# Patient Record
Sex: MALE | Race: WHITE
[De-identification: names, ages, dates, MRNs, and addresses within clinical notes are randomized per-mention and may not be internally consistent; named-entity substitution may affect disease eponyms.]

---

## 2022-09-06 ENCOUNTER — HOSPITAL ENCOUNTER (EMERGENCY)
Dept: HOSPITAL 4 - SED | Age: 47
Discharge: LEFT BEFORE DISCHARGE | End: 2022-09-06
Payer: MEDICAID

## 2022-09-06 VITALS — SYSTOLIC BLOOD PRESSURE: 120 MMHG

## 2022-09-06 VITALS — WEIGHT: 180 LBS | BODY MASS INDEX: 24.38 KG/M2 | HEIGHT: 72 IN

## 2022-09-06 VITALS — SYSTOLIC BLOOD PRESSURE: 138 MMHG

## 2022-09-06 DIAGNOSIS — Z20.822: ICD-10-CM

## 2022-09-06 DIAGNOSIS — R07.89: Primary | ICD-10-CM

## 2022-09-06 DIAGNOSIS — Z79.899: ICD-10-CM

## 2022-09-06 DIAGNOSIS — E86.0: ICD-10-CM

## 2022-09-06 DIAGNOSIS — D72.829: ICD-10-CM

## 2022-09-06 DIAGNOSIS — N17.9: ICD-10-CM

## 2022-09-06 LAB
ALBUMIN SERPL BCP-MCNC: 5.5 G/DL (ref 3.4–4.8)
ALT SERPL W P-5'-P-CCNC: 21 U/L (ref 12–78)
ANION GAP SERPL CALCULATED.3IONS-SCNC: 21 MMOL/L (ref 5–15)
AST SERPL W P-5'-P-CCNC: 25 U/L (ref 10–37)
BASOPHILS # BLD AUTO: 0.1 K/UL (ref 0–0.2)
BASOPHILS NFR BLD AUTO: 0.2 % (ref 0–2)
BILIRUB SERPL-MCNC: 1.5 MG/DL (ref 0–1)
BUN SERPL-MCNC: 46 MG/DL (ref 8–21)
CALCIUM SERPL-MCNC: 10 MG/DL (ref 8.4–11)
CHLORIDE SERPL-SCNC: 90 MMOL/L (ref 98–107)
CREAT SERPL-MCNC: 8.64 MG/DL (ref 0.55–1.3)
EOSINOPHIL # BLD AUTO: 0 K/UL (ref 0–0.4)
EOSINOPHIL NFR BLD AUTO: 0.2 % (ref 0–4)
ERYTHROCYTE [DISTWIDTH] IN BLOOD BY AUTOMATED COUNT: 13.8 % (ref 9–15)
GFR SERPL CREATININE-BSD FRML MDRD: 9 ML/MIN (ref 90–?)
GLUCOSE SERPL-MCNC: 121 MG/DL (ref 70–99)
HCT VFR BLD AUTO: 53.1 % (ref 36–54)
HGB BLD-MCNC: 18.3 G/DL (ref 14–18)
LYMPHOCYTES # BLD AUTO: 1.3 K/UL (ref 1–5.5)
LYMPHOCYTES NFR BLD AUTO: 6.2 % (ref 20.5–51.5)
MCH RBC QN AUTO: 32 PG (ref 27–31)
MCHC RBC AUTO-ENTMCNC: 34 % (ref 32–36)
MCV RBC AUTO: 92 FL (ref 79–98)
MONOCYTES # BLD MANUAL: 1.5 K/UL (ref 0–1)
MONOCYTES # BLD MANUAL: 7.4 % (ref 1.7–9.3)
NEUTROPHILS # BLD AUTO: 17.9 K/UL (ref 1.8–7.7)
NEUTROPHILS NFR BLD AUTO: 86 % (ref 40–70)
PLATELET # BLD AUTO: 307 K/UL (ref 130–430)
POTASSIUM SERPL-SCNC: 4.3 MMOL/L (ref 3.5–5.1)
RBC # BLD AUTO: 5.78 MIL/UL (ref 4.2–6.2)
SODIUM SERPLBLD-SCNC: 135 MMOL/L (ref 136–145)
WBC # BLD AUTO: 20.8 K/UL (ref 4.8–10.8)

## 2022-09-06 PROCEDURE — 93005 ELECTROCARDIOGRAM TRACING: CPT

## 2022-09-06 PROCEDURE — 84484 ASSAY OF TROPONIN QUANT: CPT

## 2022-09-06 PROCEDURE — 36415 COLL VENOUS BLD VENIPUNCTURE: CPT

## 2022-09-06 PROCEDURE — 96360 HYDRATION IV INFUSION INIT: CPT

## 2022-09-06 PROCEDURE — 87426 SARSCOV CORONAVIRUS AG IA: CPT

## 2022-09-06 PROCEDURE — 80053 COMPREHEN METABOLIC PANEL: CPT

## 2022-09-06 PROCEDURE — 71045 X-RAY EXAM CHEST 1 VIEW: CPT

## 2022-09-06 PROCEDURE — 85025 COMPLETE CBC W/AUTO DIFF WBC: CPT

## 2022-09-06 PROCEDURE — 99285 EMERGENCY DEPT VISIT HI MDM: CPT
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
## 2022-09-06 NOTE — NUR
PATIENT TOLD RN HE WANTED TO USE THE BATHROOM, RN DISCONNECTED PATIENT FROM THE 
 IV AND WHEN PATIENT RE TURNED FROM THE THE BATHROOM PATIENT WAS CONNECTED BACK 
TO THE INFUSION PUMP.  RN THEN  WENT TO CARE FOR PATIENT IN BED 2   WHO WAS 
UNSTABLE AT THIS TIME. BY THE TIME RN CAME OUT  , PATIENT HAD PULLED OUT HIS 
PIV  AND WENT AMA. WE LOOKEB ALL OVER, AND PT COULD NOT BE FOUNG, PATIENT LEFT 
AMA WITH HIS BELONGING S ON TH BED,MD, SECURITY AND SUPEVISOR MADE AWARE

## 2022-09-07 ENCOUNTER — HOSPITAL ENCOUNTER (INPATIENT)
Dept: HOSPITAL 4 - SED | Age: 47
LOS: 3 days | Discharge: HOME | DRG: 465 | End: 2022-09-10
Attending: SPECIALIST | Admitting: SPECIALIST
Payer: MEDICAID

## 2022-09-07 VITALS — WEIGHT: 158 LBS | BODY MASS INDEX: 23.95 KG/M2 | HEIGHT: 68 IN

## 2022-09-07 VITALS — SYSTOLIC BLOOD PRESSURE: 147 MMHG

## 2022-09-07 VITALS — SYSTOLIC BLOOD PRESSURE: 121 MMHG

## 2022-09-07 DIAGNOSIS — Z79.899: ICD-10-CM

## 2022-09-07 DIAGNOSIS — N17.9: ICD-10-CM

## 2022-09-07 DIAGNOSIS — N13.30: Primary | ICD-10-CM

## 2022-09-07 LAB
ALBUMIN SERPL BCP-MCNC: 4.6 G/DL (ref 3.4–4.8)
ALT SERPL W P-5'-P-CCNC: 27 U/L (ref 12–78)
AMPHETAMINES UR QL SCN: POSITIVE
ANION GAP SERPL CALCULATED.3IONS-SCNC: 18 MMOL/L (ref 5–15)
APPEARANCE UR: (no result)
AST SERPL W P-5'-P-CCNC: 26 U/L (ref 10–37)
BACTERIA URNS QL MICRO: (no result) /HPF
BARBITURATES UR QL SCN: NEGATIVE
BASOPHILS # BLD AUTO: 0.1 K/UL (ref 0–0.2)
BASOPHILS NFR BLD AUTO: 0.5 % (ref 0–2)
BENZODIAZ UR QL SCN: NEGATIVE
BILIRUB SERPL-MCNC: 1.7 MG/DL (ref 0–1)
BILIRUB UR QL STRIP: NEGATIVE
BUN SERPL-MCNC: 62 MG/DL (ref 8–21)
BZE UR QL SCN: NEGATIVE
CALCIUM SERPL-MCNC: 9.2 MG/DL (ref 8.4–11)
CANNABINOIDS UR QL SCN: NEGATIVE
CHLORIDE SERPL-SCNC: 90 MMOL/L (ref 98–107)
COLOR UR: YELLOW
CREAT SERPL-MCNC: 6.39 MG/DL (ref 0.55–1.3)
EOSINOPHIL # BLD AUTO: 0 K/UL (ref 0–0.4)
EOSINOPHIL NFR BLD AUTO: 0.3 % (ref 0–4)
ERYTHROCYTE [DISTWIDTH] IN BLOOD BY AUTOMATED COUNT: 13.5 % (ref 9–15)
GFR SERPL CREATININE-BSD FRML MDRD: 12 ML/MIN (ref 90–?)
GLUCOSE SERPL-MCNC: 112 MG/DL (ref 70–99)
GLUCOSE UR STRIP-MCNC: NEGATIVE MG/DL
HCT VFR BLD AUTO: 46.7 % (ref 36–54)
HGB BLD-MCNC: 16.6 G/DL (ref 14–18)
HGB UR QL STRIP: (no result)
KETONES UR STRIP-MCNC: NEGATIVE MG/DL
LEUKOCYTE ESTERASE UR QL STRIP: NEGATIVE
LYMPHOCYTES # BLD AUTO: 1.5 K/UL (ref 1–5.5)
LYMPHOCYTES NFR BLD AUTO: 11.8 % (ref 20.5–51.5)
MCH RBC QN AUTO: 32 PG (ref 27–31)
MCHC RBC AUTO-ENTMCNC: 36 % (ref 32–36)
MCV RBC AUTO: 91 FL (ref 79–98)
METHADONE UR-SCNC: NEGATIVE UMOL/L
METHAMPHET UR-SCNC: POSITIVE UMOL/L
MONOCYTES # BLD MANUAL: 1.3 K/UL (ref 0–1)
MONOCYTES # BLD MANUAL: 10.1 % (ref 1.7–9.3)
NEUTROPHILS # BLD AUTO: 10.1 K/UL (ref 1.8–7.7)
NEUTROPHILS NFR BLD AUTO: 77.3 % (ref 40–70)
NITRITE UR QL STRIP: NEGATIVE
OPIATES UR QL SCN: NEGATIVE
OXYCODONE SERPL-MCNC: NEGATIVE NG/ML
PCP UR QL SCN: NEGATIVE
PH UR STRIP: 5.5 [PH] (ref 5–8)
PLATELET # BLD AUTO: 271 K/UL (ref 130–430)
POTASSIUM SERPL-SCNC: 3.6 MMOL/L (ref 3.5–5.1)
PROT UR QL STRIP: (no result)
RBC # BLD AUTO: 5.15 MIL/UL (ref 4.2–6.2)
RBC #/AREA URNS HPF: (no result) /HPF (ref 0–3)
SP GR UR STRIP: >=1.03 (ref 1–1.03)
TRICYCLICS UR-MCNC: NEGATIVE NG/ML
URINE PROPOXYPHENE SCREEN: NEGATIVE
UROBILINOGEN UR STRIP-MCNC: 0.2 MG/DL (ref 0.2–1)
WBC # BLD AUTO: 13.1 K/UL (ref 4.8–10.8)
WBC #/AREA URNS HPF: (no result) /HPF (ref 0–3)

## 2022-09-07 RX ADMIN — SODIUM CHLORIDE SCH MLS/HR: 9 INJECTION, SOLUTION INTRAVENOUS at 15:29

## 2022-09-07 NOTE — NUR
CARE ENDORSED TO CHAYA LOMAS. NAD NOTED. PT AMBULATORY, AAOX4. BOTH SIDE RAILS UP. 
AWAITING MD REASSESS AND POSSIBLE ADMISSION DECISION.

## 2022-09-07 NOTE — NUR
opening note

received report from ED RN.  patient in bed, respirations even, non labored, bed in low and 
locked position call light  within reach, IVF running as ordered.   elise draining by 
gravity.

## 2022-09-07 NOTE — NUR
CLOSING NOTE

PROVIDED SBAR TO NIGHT RN.  PATIENT IN BED, RESPIRATIONS EVEN NON LABORED, BED IN LOW AND 
LOCKED POSITION, CALL LIGHT WITHIN REACH.  IVF RUNNING AS ORDERED.  ENDORSED PICTURE TO 
WOUND ON RIGHT FOREARM. no concerns

## 2022-09-07 NOTE — NUR
MD

SPOKE WITH DR VEGA INFORMED THAT PATIENT HAS A HISTORY OF  ANXIETY AND IS REQUESTING 
MEDICATION FOR PRN.

NEW ORDER RECEIVED 



NEW ORDER FOR DIET RENAL

## 2022-09-07 NOTE — NUR
Patient will be admitted to care of [DR GARY QUINONEZ].  Admitted to [ M/S] unit.  
Will go to room [130 B].  Belongings list completed.  Complete and up to date 
summary report printed. SBAR report to be given at bedside with opportunity for 
questions.

## 2022-09-07 NOTE — NUR
Patient will be admitted to care of DR GARY QUINONEZ  Admitted to  unit.  Will go to 
room .  Belongings list completed.  Complete and up to date summary report 
printed . SBAR report to be given at bedside with opportunity for questions.

-------------------------------------------------------------------------------

Addendum: 09/07/22 at 1521 by [email or web address]

-------------------------------------------------------------------------------

REPORT GIVEN TO IVELISSE LARKIN TO ROOM 130 B.

## 2022-09-08 VITALS — SYSTOLIC BLOOD PRESSURE: 130 MMHG

## 2022-09-08 VITALS — SYSTOLIC BLOOD PRESSURE: 127 MMHG

## 2022-09-08 VITALS — SYSTOLIC BLOOD PRESSURE: 129 MMHG

## 2022-09-08 VITALS — SYSTOLIC BLOOD PRESSURE: 125 MMHG

## 2022-09-08 VITALS — SYSTOLIC BLOOD PRESSURE: 134 MMHG

## 2022-09-08 LAB
ANION GAP SERPL CALCULATED.3IONS-SCNC: 9 MMOL/L (ref 5–15)
BUN SERPL-MCNC: 50 MG/DL (ref 8–21)
CALCIUM SERPL-MCNC: 8.8 MG/DL (ref 8.4–11)
CHLORIDE SERPL-SCNC: 101 MMOL/L (ref 98–107)
CREAT SERPL-MCNC: 1.45 MG/DL (ref 0.55–1.3)
GFR SERPL CREATININE-BSD FRML MDRD: 67 ML/MIN (ref 90–?)
GLUCOSE SERPL-MCNC: 103 MG/DL (ref 70–99)
POTASSIUM SERPL-SCNC: 3.7 MMOL/L (ref 3.5–5.1)

## 2022-09-08 RX ADMIN — SODIUM CHLORIDE SCH MLS/HR: 9 INJECTION, SOLUTION INTRAVENOUS at 04:06

## 2022-09-08 RX ADMIN — SODIUM CHLORIDE SCH MLS/HR: 9 INJECTION, SOLUTION INTRAVENOUS at 17:24

## 2022-09-08 RX ADMIN — DEXTROSE MONOHYDRATE SCH MLS/HR: 50 INJECTION, SOLUTION INTRAVENOUS at 11:23

## 2022-09-08 NOTE — NUR
ACSW Leticia responded to a generated  referral. ACSW reviewed patient notes, 
assessments, and toxicology results. 



ACSW Leticia met with patient at bedside. ACSW completed introductions, provided business 
card and reason for referral. 



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 
weekly. Longest time of sobriety x1 year



Mental Health- Patient discloses previous diagnosis of Depression. He is being followed for 
medication management monthly with a psychiatrist at [facility] in [place]. Denies 
participation in regular individual therapy. 



Housing- Patient discloses he alternates between staying in his car and with his sister. Due 
to his health concerns, patient disclosed he is talking to his sister Natalie Celeste 
[phone] about living with her. 



Social- Patient shares he has family support. He has stayed with his daughter Andreia, but 
she lives with her mom/his ex which is no longer an option. 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 He currently 
receives GR. 



ACSW discussed the impact/connection of substance abuse, mental health, and physical health 
on one another. ACSW acknowledged and reflected on his periods of sobriety, and encouraged 
him to utilize the coping/tools used during that time to work towards sobriety. ACSW also 
encouraged patient to continue speaking with sister about staying with her. ACSW encouraged 
patient to secure a PCP to continue care following discharge. 



ACSW will continue to be available as needed.

## 2022-09-08 NOTE — NUR
ROUNDS



PATIENT IS SLEEPING, STABLE, NO SIGNS OF RESPIRATORY DISTRESS. BED IS LOCKED AND AT THE 
LOWEST LEVEL.

## 2022-09-08 NOTE — NUR
ROUNDS



PATIENT STILL SLEEPING IN BED, STABLE, NO SIGNS OF RESPIRATORY DISTRESS. CALL LIGHT WITHIN 
REACH. BED IS LOCKED AND AT THE LOWEST LEVEL.

## 2022-09-08 NOTE — NUR
OPENING NOTE



PATIENT IS RESTING IN BED, PT IS A0X4. PT ON R/A WITH SAT'S AT 99% NO SIGNS OF RESPIRATORY 
DISTRESS.  CALL LIGHT PLACED WITHIN REACH. BED IS LOCKED, ALARMED, AND AT THE LOWEST LEVEL. 
ALL SAFETY MEASURES IN PLACE

## 2022-09-08 NOTE — NUR
ASSUMPTION OF CARE



BEDSIDE REPORT RECEIVED FROM KELLEE SCOTT. AT THIS TIME, PATIENT IS RESTING IN BED, STABLE, 
NO SIGNS OF RESPIRATORY DISTRESS. PLAN OF CARE FOR THE REMAINDER OF THE EVENING IS 
COMMUNICATED WITH THE PATIENT. CALL LIGHT PLACED WITHIN REACH. BED IS LOCKED, ALARMED, AND 
AT THE LOWEST LEVEL. 

-------------------------------------------------------------------------------

Addendum: 09/08/22 at 0243 by [employer], RN RN

-------------------------------------------------------------------------------

CHAYA SCOTT**** (NOT RD)

## 2022-09-08 NOTE — NUR
CLOSING NOTE



PATIENT SLEPT WELL THROUGHOUT THE NIGHT. AT THIS TIME, HE IS RESTING IN BED, STABLE, NO 
SIGNS OF RESPIRATORY DISTRESS. CALL LIGHT PLACED WITHIN REACH. BED IS LOCKED AND AT THE 
LOWEST LEVEL. FALL AND SAFETY PRECAUTIONS HAVE BEEN IN PLACE THROUGHOUT THE SHIFT. WILL 
CONTINUE TO MONITOR UNTIL SHIFT REPORT IS GIVEN AT BEDSIDE TO AM NURSE.

## 2022-09-09 VITALS — SYSTOLIC BLOOD PRESSURE: 132 MMHG

## 2022-09-09 VITALS — SYSTOLIC BLOOD PRESSURE: 117 MMHG

## 2022-09-09 VITALS — SYSTOLIC BLOOD PRESSURE: 123 MMHG

## 2022-09-09 VITALS — SYSTOLIC BLOOD PRESSURE: 125 MMHG

## 2022-09-09 VITALS — SYSTOLIC BLOOD PRESSURE: 137 MMHG

## 2022-09-09 LAB
ALBUMIN SERPL BCP-MCNC: 3 G/DL (ref 3.4–4.8)
ALT SERPL W P-5'-P-CCNC: 30 U/L (ref 12–78)
ANION GAP SERPL CALCULATED.3IONS-SCNC: 6 MMOL/L (ref 5–15)
AST SERPL W P-5'-P-CCNC: 28 U/L (ref 10–37)
BASOPHILS # BLD AUTO: 0 K/UL (ref 0–0.2)
BASOPHILS NFR BLD AUTO: 0.8 % (ref 0–2)
BILIRUB SERPL-MCNC: 0.7 MG/DL (ref 0–1)
BUN SERPL-MCNC: 29 MG/DL (ref 8–21)
CALCIUM SERPL-MCNC: 8.5 MG/DL (ref 8.4–11)
CHLORIDE SERPL-SCNC: 105 MMOL/L (ref 98–107)
CREAT SERPL-MCNC: 0.98 MG/DL (ref 0.55–1.3)
EOSINOPHIL # BLD AUTO: 0.1 K/UL (ref 0–0.4)
EOSINOPHIL NFR BLD AUTO: 1.9 % (ref 0–4)
ERYTHROCYTE [DISTWIDTH] IN BLOOD BY AUTOMATED COUNT: 13.1 % (ref 9–15)
GFR SERPL CREATININE-BSD FRML MDRD: 106 ML/MIN (ref 90–?)
GLUCOSE SERPL-MCNC: 87 MG/DL (ref 70–99)
HCT VFR BLD AUTO: 37.5 % (ref 36–54)
HGB BLD-MCNC: 13.1 G/DL (ref 14–18)
LYMPHOCYTES # BLD AUTO: 1.6 K/UL (ref 1–5.5)
LYMPHOCYTES NFR BLD AUTO: 30.3 % (ref 20.5–51.5)
MCH RBC QN AUTO: 33 PG (ref 27–31)
MCHC RBC AUTO-ENTMCNC: 35 % (ref 32–36)
MCV RBC AUTO: 94 FL (ref 79–98)
MONOCYTES # BLD MANUAL: 0.7 K/UL (ref 0–1)
MONOCYTES # BLD MANUAL: 14.3 % (ref 1.7–9.3)
NEUTROPHILS # BLD AUTO: 2.7 K/UL (ref 1.8–7.7)
NEUTROPHILS NFR BLD AUTO: 52.7 % (ref 40–70)
PLATELET # BLD AUTO: 178 K/UL (ref 130–430)
POTASSIUM SERPL-SCNC: 4.5 MMOL/L (ref 3.5–5.1)
RBC # BLD AUTO: 4 MIL/UL (ref 4.2–6.2)
WBC # BLD AUTO: 5.2 K/UL (ref 4.8–10.8)

## 2022-09-09 RX ADMIN — DEXTROSE MONOHYDRATE SCH MLS/HR: 50 INJECTION, SOLUTION INTRAVENOUS at 15:04

## 2022-09-09 RX ADMIN — SODIUM CHLORIDE SCH MLS/HR: 9 INJECTION, SOLUTION INTRAVENOUS at 05:06

## 2022-09-09 NOTE — NUR
ATTENDING MD DR SALVADOR WAS CALLED, RE:  TO MARTIN PALACIO WHEN PT GOES HOME OR NOT.  SPOKE TO 
SHEREE.

## 2022-09-09 NOTE — NUR
shift Summary



patient is AAOX4. vitals are stable. Coelho catheter removed and PVR done. patient informed 
that hes cleared to be discharge. patient stated he doesnt have anyone to pick him up until 
tomorrow. informed Lucy, , regarding situation and she stated patient will stay 
the night. spoke to Dr. Spain regarding situation and changed discharge orders for tomorrow. 
patient currently resting and has no questions or concerns. will endorse to oncoming nurse. 
call light within reach. bed set to low.

## 2022-09-09 NOTE — NUR
Frandy from [facility] calls to ask if patient is discharged

Informed patient has discharge order for 9/10/22

## 2022-09-10 VITALS — SYSTOLIC BLOOD PRESSURE: 121 MMHG

## 2022-09-10 VITALS — SYSTOLIC BLOOD PRESSURE: 116 MMHG

## 2022-09-10 RX ADMIN — SODIUM CHLORIDE SCH MLS/HR: 9 INJECTION, SOLUTION INTRAVENOUS at 09:10

## 2022-09-10 RX ADMIN — SODIUM CHLORIDE SCH MLS/HR: 9 INJECTION, SOLUTION INTRAVENOUS at 02:13

## 2022-09-10 NOTE — NUR
D/C Patient

Patient given medication reconciliation form and D/C instructions. Exit Care provided. 
Patient verbalized understanding. MD discussed with patient the results and treatment 
provided. Ambulatory with steady gait for discharge to home. Patient in stable condition, ID 
band removed. IV catheter removed, intact and dressing applied, no active bleeding. nO rx 
GIVEN. Pt instructed to see PCP in one week and to continue his home medciations. Patient 
educated on pain management. All belongings sent with patient.

## 2022-09-10 NOTE — NUR
pt has order for dc home, spoke with pt and told him i will call family #, the tel no is no 
longer in service.

## 2022-09-10 NOTE — NUR
Patient sleeping in bed, unlabored breathing on room air. Patient stated his sister Natalie 
may be able to pick him up today but he has not been able to get in contact with her because 
her voicemail box is full. Call light in reach, bed low and locked.

## 2023-05-28 ENCOUNTER — HOSPITAL ENCOUNTER (EMERGENCY)
Dept: HOSPITAL 4 - SED | Age: 48
Discharge: HOME | End: 2023-05-28
Payer: MEDICAID

## 2023-05-28 VITALS — SYSTOLIC BLOOD PRESSURE: 150 MMHG

## 2023-05-28 VITALS — BODY MASS INDEX: 27.28 KG/M2 | WEIGHT: 180 LBS | HEIGHT: 68 IN

## 2023-05-28 DIAGNOSIS — R30.0: ICD-10-CM

## 2023-05-28 DIAGNOSIS — R39.198: ICD-10-CM

## 2023-05-28 DIAGNOSIS — Z79.899: ICD-10-CM

## 2023-05-28 DIAGNOSIS — N50.812: ICD-10-CM

## 2023-05-28 DIAGNOSIS — F15.10: Primary | ICD-10-CM

## 2023-05-28 LAB
ALBUMIN SERPL BCP-MCNC: 4.2 G/DL (ref 3.4–4.8)
ALT SERPL W P-5'-P-CCNC: 25 U/L (ref 12–78)
AMPHETAMINES UR QL SCN: POSITIVE
ANION GAP SERPL CALCULATED.3IONS-SCNC: 7 MMOL/L (ref 5–15)
APPEARANCE UR: CLEAR
AST SERPL W P-5'-P-CCNC: 14 U/L (ref 10–37)
BACTERIA URNS QL MICRO: (no result) /HPF
BARBITURATES UR QL SCN: NEGATIVE
BASOPHILS # BLD AUTO: 0 K/UL (ref 0–0.2)
BASOPHILS NFR BLD AUTO: 0.7 % (ref 0–2)
BENZODIAZ UR QL SCN: NEGATIVE
BILIRUB SERPL-MCNC: 1 MG/DL (ref 0–1)
BILIRUB UR QL STRIP: (no result)
BUN SERPL-MCNC: 15 MG/DL (ref 8–21)
BZE UR QL SCN: NEGATIVE
CALCIUM SERPL-MCNC: 8.8 MG/DL (ref 8.4–11)
CANNABINOIDS UR QL SCN: POSITIVE
CHLORIDE SERPL-SCNC: 106 MMOL/L (ref 98–107)
COLOR UR: YELLOW
CREAT SERPL-MCNC: 1.22 MG/DL (ref 0.55–1.3)
EOSINOPHIL # BLD AUTO: 0.1 K/UL (ref 0–0.4)
EOSINOPHIL NFR BLD AUTO: 1.2 % (ref 0–4)
ERYTHROCYTE [DISTWIDTH] IN BLOOD BY AUTOMATED COUNT: 14.1 % (ref 9–15)
GFR SERPL CREATININE-BSD FRML MDRD: 82 ML/MIN (ref 90–?)
GLUCOSE SERPL-MCNC: 94 MG/DL (ref 70–99)
GLUCOSE UR STRIP-MCNC: NEGATIVE MG/DL
HCT VFR BLD AUTO: 43.9 % (ref 36–54)
HGB BLD-MCNC: 15 G/DL (ref 14–18)
HGB UR QL STRIP: NEGATIVE
KETONES UR STRIP-MCNC: NEGATIVE MG/DL
LEUKOCYTE ESTERASE UR QL STRIP: NEGATIVE
LYMPHOCYTES # BLD AUTO: 1.6 K/UL (ref 1–5.5)
LYMPHOCYTES NFR BLD AUTO: 25.8 % (ref 20.5–51.5)
MCH RBC QN AUTO: 32 PG (ref 27–31)
MCHC RBC AUTO-ENTMCNC: 34 % (ref 32–36)
MCV RBC AUTO: 94 FL (ref 79–98)
METHADONE UR-SCNC: NEGATIVE UMOL/L
METHAMPHET UR-SCNC: POSITIVE UMOL/L
MONOCYTES # BLD MANUAL: 0.5 K/UL (ref 0–1)
MONOCYTES # BLD MANUAL: 7.5 % (ref 1.7–9.3)
MUCOUS THREADS URNS QL MICRO: (no result) /LPF
NEUTROPHILS # BLD AUTO: 4 K/UL (ref 1.8–7.7)
NEUTROPHILS NFR BLD AUTO: 64.8 % (ref 40–70)
NITRITE UR QL STRIP: NEGATIVE
OPIATES UR QL SCN: NEGATIVE
OXYCODONE SERPL-MCNC: NEGATIVE NG/ML
PCP UR QL SCN: NEGATIVE
PH UR STRIP: 6 [PH] (ref 5–8)
PLATELET # BLD AUTO: 227 K/UL (ref 130–430)
PROT UR QL STRIP: (no result)
RBC # BLD AUTO: 4.65 MIL/UL (ref 4.2–6.2)
RBC #/AREA URNS HPF: (no result) /HPF (ref 0–3)
SP GR UR STRIP: >=1.03 (ref 1–1.03)
TRICYCLICS UR-MCNC: NEGATIVE NG/ML
URINE PROPOXYPHENE SCREEN: NEGATIVE
UROBILINOGEN UR STRIP-MCNC: 0.2 MG/DL (ref 0.2–1)
WBC # BLD AUTO: 6.2 K/UL (ref 4.8–10.8)
WBC #/AREA URNS HPF: (no result) /HPF (ref 0–3)

## 2023-05-28 PROCEDURE — 85025 COMPLETE CBC W/AUTO DIFF WBC: CPT

## 2023-05-28 PROCEDURE — 99285 EMERGENCY DEPT VISIT HI MDM: CPT

## 2023-05-28 PROCEDURE — 80053 COMPREHEN METABOLIC PANEL: CPT

## 2023-05-28 PROCEDURE — 96361 HYDRATE IV INFUSION ADD-ON: CPT

## 2023-05-28 PROCEDURE — 76376 3D RENDER W/INTRP POSTPROCES: CPT

## 2023-05-28 PROCEDURE — 80307 DRUG TEST PRSMV CHEM ANLYZR: CPT

## 2023-05-28 PROCEDURE — 74176 CT ABD & PELVIS W/O CONTRAST: CPT

## 2023-05-28 PROCEDURE — 36415 COLL VENOUS BLD VENIPUNCTURE: CPT

## 2023-05-28 PROCEDURE — 96374 THER/PROPH/DIAG INJ IV PUSH: CPT

## 2023-05-28 PROCEDURE — 81000 URINALYSIS NONAUTO W/SCOPE: CPT

## 2023-05-28 PROCEDURE — 96375 TX/PRO/DX INJ NEW DRUG ADDON: CPT
